# Patient Record
Sex: MALE | URBAN - METROPOLITAN AREA
[De-identification: names, ages, dates, MRNs, and addresses within clinical notes are randomized per-mention and may not be internally consistent; named-entity substitution may affect disease eponyms.]

---

## 2017-02-14 ENCOUNTER — OFFICE VISIT (OUTPATIENT)
Dept: SURGERY | Age: 42
End: 2017-02-14

## 2017-02-14 VITALS
HEART RATE: 78 BPM | TEMPERATURE: 98 F | BODY MASS INDEX: 33.33 KG/M2 | SYSTOLIC BLOOD PRESSURE: 126 MMHG | HEIGHT: 69 IN | WEIGHT: 225 LBS | DIASTOLIC BLOOD PRESSURE: 68 MMHG | RESPIRATION RATE: 16 BRPM

## 2017-02-14 DIAGNOSIS — L29.0 PRURITUS ANI: Primary | ICD-10-CM

## 2017-02-14 RX ORDER — HYDROCORTISONE 25 MG/G
CREAM TOPICAL
Qty: 30 G | Refills: 0 | Status: SHIPPED | OUTPATIENT
Start: 2017-02-14

## 2017-02-14 NOTE — PROGRESS NOTES
[facility]  Colon and Rectal Surgery  [address] [address] [place], [facility]              Colon and Rectal Surgery Consult          Patient: Stephan Clement  MRN: [id number]  Date: 2/14/2017     Age:  39 y.o.,      Sex: male    YOB: 1975      Subjective     is an 39 y.o. male who presents with symptoms of ongoing itching in his perianal skin.  reports symptoms have presented for 3 weeks. He was suffering from acute gastroenteritis with diarrhea at that time. This has resolved but the itching is still present. He has not had previous rectal surgery. Previous treatments have included over the counter topical interventions without success.  denies associated fever. A history of inflammatory bowel disease has not been reported. The patient denies any rectal bleeding, weight changes, nor any abdominal pain. Patient also denies constipation, vomiting, bloody stools, mucousy stools, reflux and nausea. Bowel habits are reported as now normal without unsual diarrhea, constipation, blood or pain. The family history is positive for colon cancer in his paternal grandfather and colon polyps in his father. Past Surgical History   Procedure Laterality Date    Hx lipoma resection         Allergies   Allergen Reactions    Percocet [Oxycodone-Acetaminophen] Rash and Itching       Prior to Admission medications    Medication Sig Start Date End Date Taking? Authorizing Provider   diazepam (VALIUM) 5 mg tablet 1-2  Tabs 1 hour before biopsy 7/3/14   Charisma Hernandez MD   HYDROcodone-acetaminophen [facility]) 5-325 mg per tablet Take 1 Tab by mouth every four (4) hours as needed for Pain.  7/3/14   Charisma Hernandez MD       Current Outpatient Prescriptions   Medication Sig Dispense Refill    diazepam (VALIUM) 5 mg tablet 1-2  Tabs 1 hour before biopsy 2 Tab 0    HYDROcodone-acetaminophen (NORCO) 5-325 mg per tablet Take 1 Tab by mouth every four (4) hours as needed for Pain. 20 Tab 0       Social History     Social History    Marital status:      Spouse name: N/A    Number of children: N/A    Years of education: N/A     Occupational History    Not on file. Social History Main Topics    Smoking status: Never Smoker    Smokeless tobacco: Never Used    Alcohol use Yes    Drug use: No    Sexual activity: Yes     Partners: Female     Other Topics Concern    Not on file     Social History Narrative       Family History   Problem Relation Age of Onset    No Known Problems Mother     Colon Polyps Father     Bleeding Prob Paternal Grandfather      colon           Review of Systems:    A comprehensive review of systems was negative except for that written in the History of Present Illness. Objective:        Visit Vitals    /68    Pulse 78    Temp 98 °F (36.7 °C) (Oral)    Resp 16    Ht 5' 9\" (1.753 m)    Wt 102.1 kg (225 lb)    BMI 33.23 kg/m2       Physical Exam:   GENERAL: alert, cooperative, no distress, appears stated age  LUNG: clear to auscultation bilaterally  HEART: regular rate and rhythm, S1, S2 normal, no murmur, click, rub or gallop  EXTREMITIES:  extremities normal, atraumatic, no cyanosis or edema     Anorectal:  With the patient in the prone position the anus appeared abnormal with findings of mostly mild pruritis ani findings in the perianal skin circumferentially. Digital rectal examination revealed increased sphincter tone and squeeze pressure. Palpation revealed No Masses. Anoscopy revealed normal findings otherwise. Assessment / Angelina Justice is an 39 y.o. male with mild pruritus ani. The patient was reassured, and I recommended starting the management regimen consisting of:    1. Frequent sitz baths at home as needed. 2. Avoidence of any soap to the anal area. 3. Application of Proctosol HC cream as instructed.     The patient will follow up in my clinic if he has any concerns whatsoever.             Eduard Ness MD, FACS, FASCRS  Colon and Rectal Surgery  [facility]  Office [phone]  Fax     [phone]  2/14/2017  2:01 PM

## 2017-02-14 NOTE — PATIENT INSTRUCTIONS
If you have any questions or concerns about today's appointment, the verbal and/or written instructions you were given for follow up care, please call our office at [phone].     [facility] - [address]    [phone] office  [phone]

## 2017-02-14 NOTE — PROGRESS NOTES
Patient presents for anal itching that has been present for several weeks. He denies bleeding or pain. He reports a family hx of colon CA. He has never had a colonoscopy.